# Patient Record
(demographics unavailable — no encounter records)

---

## 2024-12-09 NOTE — PROCEDURE
[FreeTextEntry3] : Procedure Note: Musculoskeletal Injection Diagnosis: Bilateral knee osteoarthritis  Procedure: Bilateral knee, superolateral. Kenalog 40 injection    Indication:  The patient has had persistent pain despite conservative treatment.  Risks, benefits and alternatives to procedure were discussed; all questions were answered to the patient's apparent satisfaction and informed consent obtained.  The patient denied prior problems with local anesthetics, injectable cortisones, chicken allergy, coagulopathy and no relevant drug or preservative allergies or sensitivities.   The area of injection was prepared in a sterile fashion.  Prior to injection a 'Time Out' was conducted in accordance with [employer] policy and the site and nature of procedure verified with the patient.   Procedure: The procedure was carried out utilizing sterile technique from a superolateral arthroscopic portal position. [The needle was placed under ultrasound guidance to improve accuracy and minimize risk to the patient and diagnostic ultrasound in the long and short axis revealed advanced osteoarthritis    Ultrasound Indication: body habitus     0cc of clear synovial fluid was aspirated. The specimen: (X) appeared benign and was discarded ( ) was sent for Culture / Cell Count / Crystal analysis / [_].]     Injection into the target area with care taken to aspirate frequently to minimize the risk of intravascular injection was performed with: ( ) 1cc of Depomedrol (80mg/ml) ( ) 2cc of Betamethasone (Celestone) (6mg/ml) ( ) 1cc of Toradol (30mg/ml) (2X) 9cc of 0.5% Bupivacaine ( ) 1cc of 1% Lidocaine (2X) 2cc Kenalog-50 (40mg/mL)   Patient tolerated the procedure well and direct pressure was applied for hemostasis. The patient was reminded of potential post-injection risks including, but not limited to, delayed hypersensitivity reactions and/or infection.  The patient verified that they had the office and the Emergency Room's contact information if any problems should arise.  After several minutes, the patient informed me that they felt fine and was released from the office.

## 2024-12-09 NOTE — HISTORY OF PRESENT ILLNESS
[de-identified] : Follow up on both knees today. Had Kenalog injections in both previous visit and had obtained some relief until recently. Discomfort now with getting up and down from seated position. Left sometimes more pain than the right.,

## 2024-12-09 NOTE — PHYSICAL EXAM
[Bilateral] : knee bilaterally [NL (140)] : flexion 140 degrees [NL (0)] : extension 0 degrees [Equivocal] : equivocal Juan Alberto [] : no extensor lag

## 2024-12-09 NOTE — PROCEDURE
[FreeTextEntry3] : Procedure Note: Musculoskeletal Injection Diagnosis: Bilateral knee osteoarthritis  Procedure: Bilateral knee, superolateral. Kenalog 40 injection    Indication:  The patient has had persistent pain despite conservative treatment.  Risks, benefits and alternatives to procedure were discussed; all questions were answered to the patient's apparent satisfaction and informed consent obtained.  The patient denied prior problems with local anesthetics, injectable cortisones, chicken allergy, coagulopathy and no relevant drug or preservative allergies or sensitivities.   The area of injection was prepared in a sterile fashion.  Prior to injection a 'Time Out' was conducted in accordance with [facility] [facility] policy and the site and nature of procedure verified with the patient.   Procedure: The procedure was carried out utilizing sterile technique from a superolateral arthroscopic portal position. [The needle was placed under ultrasound guidance to improve accuracy and minimize risk to the patient and diagnostic ultrasound in the long and short axis revealed advanced osteoarthritis    Ultrasound Indication: body habitus     0cc of clear synovial fluid was aspirated. The specimen: (X) appeared benign and was discarded ( ) was sent for Culture / Cell Count / Crystal analysis / [_].]     Injection into the target area with care taken to aspirate frequently to minimize the risk of intravascular injection was performed with: ( ) 1cc of Depomedrol (80mg/ml) ( ) 2cc of Betamethasone (Celestone) (6mg/ml) ( ) 1cc of Toradol (30mg/ml) (2X) 9cc of 0.5% Bupivacaine ( ) 1cc of 1% Lidocaine (2X) 2cc Kenalog-50 (40mg/mL)   Patient tolerated the procedure well and direct pressure was applied for hemostasis. The patient was reminded of potential post-injection risks including, but not limited to, delayed hypersensitivity reactions and/or infection.  The patient verified that they had the office and the Emergency Room's contact information if any problems should arise.  After several minutes, the patient informed me that they felt fine and was released from the office.

## 2024-12-09 NOTE — DISCUSSION/SUMMARY
[Medication Risks Reviewed] : Medication risks reviewed [Surgical risks reviewed] : Surgical risks reviewed [de-identified] :  This is an acute exacerbation of a chronic issue  Confounding medical conditions/concerns: hx of knee arthroscopy, hx of hypertension Tests/Studies Independently Interpreted Today  ------------------------------------------------------------------------------------------------------------------  Discussed risks of surgical treatment and nonsurgical treatment of arthritis, discussed risks of steroid injection plus or minus Visco supplementation, risks of Zilretta and benefits, role of surgery and MRI, risks and role of NSAIDs and side effects, benefits of therapy. The patient is aware and understands that if she fails all conservative treatment modalities, she should consider a total knee arthroplasty.   Discussed options going forward considering she had mild relief from prev Kenalog inj. Discussed repeat kenalog inj vs Zilretta inj. Plan for repeat kenalog inj today  Discussed risks and benefits of total knee arthroplasty vs arthroscopic surgery however, risks outweigh benefits and we will try to avoid.  understands knee replacement high risk procedure  Evaluated the patients BILATERAL KNEE and decided to proceed with Kenalog injections. Discussed future treatment options, will proceed, discussed possible surgery vs alternatives in future if symptoms worsen or persist despite injection.    The risks, benefits and contents of the injection have been discussed. The risks include but are not limited to allergic reaction, flare reaction, permanent white skin discoloration at the injection site and infection. The patient understands the risks and agrees to having the injection. All questions have been answered.   Will submit auth for Zilretta inj for B/L knees  Follow up in 4 weeks if pain persist     I, Sarah Fernandez, attest that this documentation has been prepared under the direction and in the presence of Provider SCOTT Clark

## 2024-12-09 NOTE — DISCUSSION/SUMMARY
[Medication Risks Reviewed] : Medication risks reviewed [Surgical risks reviewed] : Surgical risks reviewed [de-identified] :  This is an acute exacerbation of a chronic issue  Confounding medical conditions/concerns: hx of knee arthroscopy, hx of hypertension Tests/Studies Independently Interpreted Today  ------------------------------------------------------------------------------------------------------------------  Discussed risks of surgical treatment and nonsurgical treatment of arthritis, discussed risks of steroid injection plus or minus Visco supplementation, risks of Zilretta and benefits, role of surgery and MRI, risks and role of NSAIDs and side effects, benefits of therapy. The patient is aware and understands that if she fails all conservative treatment modalities, she should consider a total knee arthroplasty.   Discussed options going forward considering she had mild relief from prev Kenalog inj. Discussed repeat kenalog inj vs Zilretta inj. Plan for repeat kenalog inj today  Discussed risks and benefits of total knee arthroplasty vs arthroscopic surgery however, risks outweigh benefits and we will try to avoid.  understands knee replacement high risk procedure  Evaluated the patients BILATERAL KNEE and decided to proceed with Kenalog injections. Discussed future treatment options, will proceed, discussed possible surgery vs alternatives in future if symptoms worsen or persist despite injection.    The risks, benefits and contents of the injection have been discussed. The risks include but are not limited to allergic reaction, flare reaction, permanent white skin discoloration at the injection site and infection. The patient understands the risks and agrees to having the injection. All questions have been answered.   Will submit auth for Zilretta inj for B/L knees  Follow up in 4 weeks if pain persist     I, Sarah Fernandez, attest that this documentation has been prepared under the direction and in the presence of Provider SCOTT Clark

## 2024-12-09 NOTE — HISTORY OF PRESENT ILLNESS
[de-identified] : Follow up on both knees today. Had Kenalog injections in both previous visit and had obtained some relief until recently. Discomfort now with getting up and down from seated position. Left sometimes more pain than the right.,

## 2025-03-24 NOTE — PROCEDURE
[FreeTextEntry3] : Procedure Note: Musculoskeletal Injection Diagnosis: Bilateral knee osteoarthritis  Procedure: Bilateral knee, superolateral. Kenalog 40 injection    Indication:  The patient has had persistent pain despite conservative treatment.  Risks, benefits and alternatives to procedure were discussed; all questions were answered to the patient's apparent satisfaction and informed consent obtained.  The patient denied prior problems with local anesthetics, injectable cortisones, chicken allergy, coagulopathy and no relevant drug or preservative allergies or sensitivities.   The area of injection was prepared in a sterile fashion.  Prior to injection a 'Time Out' was conducted in accordance with [employer] policy and the site and nature of procedure verified with the patient.   Procedure: The procedure was carried out utilizing sterile technique from a superolateral arthroscopic portal position. [The needle was placed under ultrasound guidance to improve accuracy and minimize risk to the patient and diagnostic ultrasound in the long and short axis revealed advanced osteoarthritis    Ultrasound Indication: body habitus     0cc of clear synovial fluid was aspirated. The specimen: (X) appeared benign and was discarded ( ) was sent for Culture / Cell Count / Crystal analysis / [_].]     Injection into the target area with care taken to aspirate frequently to minimize the risk of intravascular injection was performed with: ( ) 1cc of Depomedrol (80mg/ml) ( ) 2cc of Betamethasone (Celestone) (6mg/ml) ( ) 1cc of Toradol (30mg/ml) (2X) 9cc of 0.5% Bupivacaine ( ) 1cc of 1% Lidocaine (2X) 2cc Kenalog-50 (40mg/mL)   Patient tolerated the procedure well and direct pressure was applied for hemostasis. The patient was reminded of potential post-injection risks including, but not limited to, delayed hypersensitivity reactions and/or infection.  The patient verified that they had the office and the Emergency Room's contact information if any problems should arise.  After several minutes, the patient informed me that they felt fine and was released from the office.

## 2025-03-24 NOTE — PHYSICAL EXAM
[Bilateral] : knee bilaterally [NL (0)] : extension 0 degrees [Equivocal] : equivocal Juan Alberto [] : no extensor lag [[id number]] : flexion 125 degrees

## 2025-03-24 NOTE — DISCUSSION/SUMMARY
[Medication Risks Reviewed] : Medication risks reviewed [Surgical risks reviewed] : Surgical risks reviewed [de-identified] :  This is an acute exacerbation of a chronic issue  Confounding medical conditions/concerns: hx of knee arthroscopy, hx of hypertension Reviewed previous notes and x-rays. ------------------------------------------------------------------------------------------------------------------  Discussed risks of surgical treatment and nonsurgical treatment of arthritis, discussed risks of steroid injection plus or minus Visco supplementation, risks of Zilretta and benefits, role of surgery and MRI, risks and role of NSAIDs and side effects, benefits of therapy. The patient is aware and understands that if she fails all conservative treatment modalities, she should consider a total knee arthroplasty.  discussed knee replacement which is what she needs and best option but high risk with her obesity, discussed weight loss options and drugs like oxempic Discussed options going forward considering she had relief from prev Kenalog inj. Discussed repeat kenalog inj vs Zilretta inj. Plan for repeat Kenalog inj today  Discussed risks and benefits of total knee arthroplasty vs arthroscopic surgery however, risks outweigh benefits and we will try to avoid.  understands knee replacement high risk procedure  Evaluated the patients BILATERAL KNEE and decided to proceed with Kenalog injections. Discussed future treatment options, will proceed, discussed possible surgery vs alternatives in future if symptoms worsen or persist despite injection.    The risks, benefits and contents of the injection have been discussed. The risks include but are not limited to allergic reaction, flare reaction, permanent white skin discoloration at the injection site and infection. The patient understands the risks and agrees to having the injection. All questions have been answered.  she should continued taking prescribed voltaren and discussed side effects Discussed auth for Zilretta inj for B/L knees   Discussed TKA in detail- dispensed literature in office    I, Marguerite Cornell, attest that this documentation has been prepared under the direction and in the presence of Provider SCOTT Clark

## 2025-03-24 NOTE — HISTORY OF PRESENT ILLNESS
[de-identified] : Pt is here for a follow up on her bilateral knees. Had Kenalog/Marcaine shot last visit 12/9/24. Helped until about a month ago. Now in a lot of pain. Some swelling on right knee. Takes diclofenac for the pain with relief.

## 2025-03-24 NOTE — DISCUSSION/SUMMARY
[Medication Risks Reviewed] : Medication risks reviewed [Surgical risks reviewed] : Surgical risks reviewed [de-identified] :  This is an acute exacerbation of a chronic issue  Confounding medical conditions/concerns: hx of knee arthroscopy, hx of hypertension Reviewed previous notes and x-rays. ------------------------------------------------------------------------------------------------------------------  Discussed risks of surgical treatment and nonsurgical treatment of arthritis, discussed risks of steroid injection plus or minus Visco supplementation, risks of Zilretta and benefits, role of surgery and MRI, risks and role of NSAIDs and side effects, benefits of therapy. The patient is aware and understands that if she fails all conservative treatment modalities, she should consider a total knee arthroplasty.  discussed knee replacement which is what she needs and best option but high risk with her obesity, discussed weight loss options and drugs like oxempic Discussed options going forward considering she had relief from prev Kenalog inj. Discussed repeat kenalog inj vs Zilretta inj. Plan for repeat Kenalog inj today  Discussed risks and benefits of total knee arthroplasty vs arthroscopic surgery however, risks outweigh benefits and we will try to avoid.  understands knee replacement high risk procedure  Evaluated the patients BILATERAL KNEE and decided to proceed with Kenalog injections. Discussed future treatment options, will proceed, discussed possible surgery vs alternatives in future if symptoms worsen or persist despite injection.    The risks, benefits and contents of the injection have been discussed. The risks include but are not limited to allergic reaction, flare reaction, permanent white skin discoloration at the injection site and infection. The patient understands the risks and agrees to having the injection. All questions have been answered.  she should continued taking prescribed voltaren and discussed side effects Discussed auth for Zilretta inj for B/L knees   Discussed TKA in detail- dispensed literature in office    I, Marguerite Cornell, attest that this documentation has been prepared under the direction and in the presence of Provider SCOTT Clark

## 2025-03-24 NOTE — PROCEDURE
[FreeTextEntry3] : Procedure Note: Musculoskeletal Injection Diagnosis: Bilateral knee osteoarthritis  Procedure: Bilateral knee, superolateral. Kenalog 40 injection    Indication:  The patient has had persistent pain despite conservative treatment.  Risks, benefits and alternatives to procedure were discussed; all questions were answered to the patient's apparent satisfaction and informed consent obtained.  The patient denied prior problems with local anesthetics, injectable cortisones, chicken allergy, coagulopathy and no relevant drug or preservative allergies or sensitivities.   The area of injection was prepared in a sterile fashion.  Prior to injection a 'Time Out' was conducted in accordance with [facility] [employer] [facility] policy and the site and nature of procedure verified with the patient.   Procedure: The procedure was carried out utilizing sterile technique from a superolateral arthroscopic portal position. [The needle was placed under ultrasound guidance to improve accuracy and minimize risk to the patient and diagnostic ultrasound in the long and short axis revealed advanced osteoarthritis    Ultrasound Indication: body habitus     0cc of clear synovial fluid was aspirated. The specimen: (X) appeared benign and was discarded ( ) was sent for Culture / Cell Count / Crystal analysis / [_].]     Injection into the target area with care taken to aspirate frequently to minimize the risk of intravascular injection was performed with: ( ) 1cc of Depomedrol (80mg/ml) ( ) 2cc of Betamethasone (Celestone) (6mg/ml) ( ) 1cc of Toradol (30mg/ml) (2X) 9cc of 0.5% Bupivacaine ( ) 1cc of 1% Lidocaine (2X) 2cc Kenalog-50 (40mg/mL)   Patient tolerated the procedure well and direct pressure was applied for hemostasis. The patient was reminded of potential post-injection risks including, but not limited to, delayed hypersensitivity reactions and/or infection.  The patient verified that they had the office and the Emergency Room's contact information if any problems should arise.  After several minutes, the patient informed me that they felt fine and was released from the office.

## 2025-03-24 NOTE — DISCUSSION/SUMMARY
[Medication Risks Reviewed] : Medication risks reviewed [Surgical risks reviewed] : Surgical risks reviewed [de-identified] :  This is an acute exacerbation of a chronic issue  Confounding medical conditions/concerns: hx of knee arthroscopy, hx of hypertension Reviewed previous notes and x-rays. ------------------------------------------------------------------------------------------------------------------  Discussed risks of surgical treatment and nonsurgical treatment of arthritis, discussed risks of steroid injection plus or minus Visco supplementation, risks of Zilretta and benefits, role of surgery and MRI, risks and role of NSAIDs and side effects, benefits of therapy. The patient is aware and understands that if she fails all conservative treatment modalities, she should consider a total knee arthroplasty.  discussed knee replacement which is what she needs and best option but high risk with her obesity, discussed weight loss options and drugs like oxempic Discussed options going forward considering she had relief from prev Kenalog inj. Discussed repeat kenalog inj vs Zilretta inj. Plan for repeat Kenalog inj today  Discussed risks and benefits of total knee arthroplasty vs arthroscopic surgery however, risks outweigh benefits and we will try to avoid.  understands knee replacement high risk procedure  Evaluated the patients BILATERAL KNEE and decided to proceed with Kenalog injections. Discussed future treatment options, will proceed, discussed possible surgery vs alternatives in future if symptoms worsen or persist despite injection.    The risks, benefits and contents of the injection have been discussed. The risks include but are not limited to allergic reaction, flare reaction, permanent white skin discoloration at the injection site and infection. The patient understands the risks and agrees to having the injection. All questions have been answered.  she should continued taking prescribed voltaren and discussed side effects Discussed auth for Zilretta inj for B/L knees   Discussed TKA in detail- dispensed literature in office    I, Marguerite Cornell, attest that this documentation has been prepared under the direction and in the presence of Provider SCOTT Clark

## 2025-03-24 NOTE — HISTORY OF PRESENT ILLNESS
[de-identified] : Pt is here for a follow up on her bilateral knees. Had Kenalog/Marcaine shot last visit 12/9/24. Helped until about a month ago. Now in a lot of pain. Some swelling on right knee. Takes diclofenac for the pain with relief.

## 2025-03-24 NOTE — HISTORY OF PRESENT ILLNESS
[de-identified] : Pt is here for a follow up on her bilateral knees. Had Kenalog/Marcaine shot last visit 12/9/24. Helped until about a month ago. Now in a lot of pain. Some swelling on right knee. Takes diclofenac for the pain with relief.

## 2025-03-24 NOTE — PROCEDURE
[FreeTextEntry3] : Procedure Note: Musculoskeletal Injection Diagnosis: Bilateral knee osteoarthritis  Procedure: Bilateral knee, superolateral. Kenalog 40 injection    Indication:  The patient has had persistent pain despite conservative treatment.  Risks, benefits and alternatives to procedure were discussed; all questions were answered to the patient's apparent satisfaction and informed consent obtained.  The patient denied prior problems with local anesthetics, injectable cortisones, chicken allergy, coagulopathy and no relevant drug or preservative allergies or sensitivities.   The area of injection was prepared in a sterile fashion.  Prior to injection a 'Time Out' was conducted in accordance with [facility] [employer] [facility] policy and the site and nature of procedure verified with the patient.   Procedure: The procedure was carried out utilizing sterile technique from a superolateral arthroscopic portal position. [The needle was placed under ultrasound guidance to improve accuracy and minimize risk to the patient and diagnostic ultrasound in the long and short axis revealed advanced osteoarthritis    Ultrasound Indication: body habitus     0cc of clear synovial fluid was aspirated. The specimen: (X) appeared benign and was discarded ( ) was sent for Culture / Cell Count / Crystal analysis / [_].]     Injection into the target area with care taken to aspirate frequently to minimize the risk of intravascular injection was performed with: ( ) 1cc of Depomedrol (80mg/ml) ( ) 2cc of Betamethasone (Celestone) (6mg/ml) ( ) 1cc of Toradol (30mg/ml) (2X) 9cc of 0.5% Bupivacaine ( ) 1cc of 1% Lidocaine (2X) 2cc Kenalog-50 (40mg/mL)   Patient tolerated the procedure well and direct pressure was applied for hemostasis. The patient was reminded of potential post-injection risks including, but not limited to, delayed hypersensitivity reactions and/or infection.  The patient verified that they had the office and the Emergency Room's contact information if any problems should arise.  After several minutes, the patient informed me that they felt fine and was released from the office.

## 2025-05-02 NOTE — PHYSICAL EXAM
[de-identified] :  General appearance: well nourished and hydrated, pleasant, alert and oriented x 3, cooperative. Obese HEENT: Normocephalic, EOM intact, Nasal septum midline, Oral cavity clear, External auditory canal clear. Cardiovascular: no apparent abnormalities, no lower leg edema, no varicosities, pedal pulses are palpable. Lymphatics Lymph nodes: none palpated, Lymphedema: not present. Neurologic: sensation is normal, no muscle weakness in upper or lower extremities, patella tendon reflexes intact . Dermatologic no apparent skin lesions, moist, warm, no rash. Spine:cervical spine appears normal and moves freely, thoracic spine appears normal and moves freely, lumbosacral spine appears normal and moves freely. Gait: nonantalgic.   Left knee Inspection: trace effusion. Wounds: 12-degree Valgus. Alignment: normal. Palpation: medial and lateral tenderness on palpation. ROM active (in degrees): 0-90 with crepitus through the arc of motion  Ligamentous laxity: all ligaments appear stable,, negative ant. drawer test, negative post. drawer test, stable to varus stress test, stable to valgus stress test. negative Lachman's test, negative pivot shift test Meniscal Test: negative McMurrays, negative Glen. Patellofemoral Alignment Test: Q angle-, normal. Muscle Test: good quad strength. Leg examination: calf is soft and non-tender.   Right knee Inspection: mild effusion. Wounds: none. Alignment: 5-degree Varus. Palpation: medial tenderness on palpation. ROM active (in degrees): 0-110 with crepitus through the arc of motion  Ligamentous laxity: all ligaments appear stable,, negative ant. drawer test, negative post. drawer test, stable to varus stress test, stable to valgus stress test. negative Lachman's test, negative pivot shift test Meniscal Test: negative McMurrays, negative Glen. Patellofemoral Alignment Test: Q angle-, normal. Muscle Test: good quad strength. Leg examination: calf is soft and non-tender.   Left hip Inspection: No swelling or ecchymosis. Wounds: none. Palpation: non-tender. Stability: no instability. Strength: 5/5 all motor groups. ROM: no pain with FROM. Leg length: equal.   Right hip Inspection: No swelling or ecchymosis. Wounds: none. Palpation: non-tender. Stability: no instability. Strength: 5/5 all motor groups. ROM: no pain with FROM. Leg length: equal.   Left ankle Inspection: no erythema noted, no swelling noted. Palpation: no pain on palpation . ROM: FROM without crepitus. Muscle strength: 5/5. Stability: no instability noted.   Right ankle Inspection: no erythema noted, no swelling noted. ROM: FROM without crepitus. Palpation: no pain on palpation . Muscle strength: 5/5. Stability: no instability noted.   Left foot Inspection: color, texture and turgor are normal. ROM: full range of motion of all joints without pain or crepitus. Palpation: no tenderness. Stability: no instability noted.   Right foot Inspection: color, texture and turgor are normal. ROM: full range of motion of all joints without pain or crepitus. Palpation: no tenderness. Stability: no instability noted.   Left shoulder Inspection: no muscle asymmetry, no atrophy. Palpation: no tenderness noted, ACJ non-tender. ROM: full active ROM, full passive ROM. Strength testing): anterior deltoid, supraspinatus, infraspinatus, subscapularis all 5/5. Stability test: ant. apprehension negative, post. apprehension negative, relocation test negative. Impingement Test: negative NEER.   Right shoulder Inspection: no muscle asymmetry, no atrophy. Palpation: no tenderness noted, ACJ non-tender. ROM: full active ROM, full passive ROM. Strength testing): anterior deltoid, supraspinatus, infraspinatus, subscapularis all 5/5. Stability test: ant. apprehension negative, post. apprehension negative, relocation test negative. Impingement Test: negative NEER. Surgical Wounds: none.   Left elbow Inspection: negative swelling. Wounds: none. Palpation: non-tender. ROM: full ROM. Strength: 5/5 all groups. Stability: no instability. Mass: none.   Right elbow Inspection: negative swelling. Wounds: none. Palpation: non-tender. ROM: full ROM. Strength: 5/5 all groups. Stability: no instability. Mass: none.   Left wrist Inspection: negative swelling. Wound: none. Palpation (bone): no tenderness. ROM: full ROM. Strength: full , good.   Right wrist Inspection: negative swelling. Wound: none. Palpation (bone): no tenderness. ROM: full ROM. Strength: full , good.   Left hand Inspection: no skin changes, normal appearance. Wounds: none. Strength: full , able to make full fist. Sensation: light touch intact all fingers and thumb. Vascular: good capillary refill < 3 seconds, all fingers and thumb. Mass: none.   Right hand Inspection: no skin changes, normal appearance. Wounds: none. Strength: full , able to make full fist. Sensation: light touch intact all fingers and thumb. Vascular: good capillary refill < 3 seconds, all fingers and thumb. Mass: none. [de-identified] : Right knee x-rays, standing AP/Lateral and Merchant films, and 45-degree PA standing view, taken at the office today shows diffuse tricompartmental degenerative arthritis, mild varus deformity, medial joint space narrowing, marginal osteophytes, medial bone on bone sclerosis, patellofemoral joint space narrowing, peripheral osteophytes, Kellgren and Maykel grade 4.   Left knee x-rays, standing AP/Lateral and Merchant films, and 45-degree PA standing view, taken at the office today shows diffuse tricompartmental degenerative arthritis, valgus deformity, medial joint space narrowing, marginal osteophytes, lateral bone on bone sclerosis, patellofemoral joint space narrowing, peripheral osteophytes, Kellgren and Maykel grade 4.

## 2025-05-02 NOTE — ADDENDUM
[FreeTextEntry1] : This note was written by Negro Payne on 05/02/2025 acting as scribe for Dr. Lyle Guillen M.D.  I, Dr. Lyle Guillen, have read and attest that all the information, medical decision making and discharge instructions within are true and accurate.

## 2025-05-02 NOTE — DISCUSSION/SUMMARY
[de-identified] : Discussed at length the nature of the patient's condition. Their BL knee symptoms appear secondary to severe degenerative arthritis with a Valgus deformity on the left knee and a varus deformity on the right knee. She has failed all non-operative treatment plans at this time. Discussed at length the natural history of degenerative arthritis and reviewed non-operative and operative treatment. Prior non-operative treatment for more than 3 months by either myself or prior treating physicians, including NSAIDs, injection therapy, a structured exercise program or physiotherapy and weight management has not resolved the condition. Due to the pain and associated functional disability that impacts all appropriate activities of daily living, I recommend a staged bilateral total knee replacement, the LEFT side would be done first. A thorough discussion regarding the risks, benefits, convalescence and alternatives were reviewed. The risks can include but are not limited to: anesthesia risk, bleeding wit possible transfusion, nerve injury, infection, revision surgery due to implant failure, bone fracture, deep vein thrombosis, cardiac failure, pneumonia, and respiratory distress. The patient's expectations were reviewed and compared to the surgeon's expectations. This allowed for a discussion regarding reasonable expectations for functional improvement, pain relief, and return to normal activities of daily living. Numerous questions were asked and answered to their satisfaction. The patient was involved in the decision-making process - we discussed implant choice and fixation along with all details of TKA. Models were used as an educational tool. Surgery will be scheduled at a convenient time.  Preop medical clearance.  Patient can continue home exercises, Tylenol, weight management and activities as tolerated. All questions were answered, understanding verbalized. Patient is in agreement with plan of treatment. Patient may follow up as needed.

## 2025-05-02 NOTE — HISTORY OF PRESENT ILLNESS
[de-identified] :  GABE LIMA 67 year old female presents for an initial evaluation of BL knee pain with the left being worse than the right. The pain has been ongoing for years. She had a left knee scope 20 years ago. She has been treating her BL knees with cortisone injections every 3 months as well as gel injections. She takes Diclofenac for pain relief. She notes swelling of right knee and left ankle. She has to be cautious when walking. She uses the riling on stairs. She inquiries about surgery. Current BMI is 41. Medical history includes high blood pressure and GERD. No allergies.

## 2025-05-02 NOTE — CONSULT LETTER
[Dear  ___] : Dear  [unfilled], [Consult Letter:] : I had the pleasure of evaluating your patient, [unfilled]. [Please see my note below.] : Please see my note below. [Consult Closing:] : Thank you very much for allowing me to participate in the care of this patient.  If you have any questions, please do not hesitate to contact me. [Sincerely,] : Sincerely, [FreeTextEntry2] : PRAMOD STEEN

## 2025-06-18 NOTE — HISTORY OF PRESENT ILLNESS
[Preoperative Visit] : for a medical evaluation prior to surgery [Scheduled Procedure ___] : a [unfilled] [Date of Surgery ___] : on [unfilled] [Surgeon Name ___] : surgeon: [unfilled] [FreeTextEntry1] : 67 year-old female seen several years ago for evaluation of hypertension and dyspnea on exertion.  Patient has long-standing history of hypertension but denies any history of diabetes, hyperlipidemia or coronary artery disease.  She has a long-standing history of obesity. Unable to gauge exercise capacity due to inability to ambulate 2/2 bilateral knee pain. States that her BP is currently elevated because of stress. Denied any cardiac family history, nondrinker, nonsmoker. Works as supervisor in office.

## 2025-06-18 NOTE — DISCUSSION/SUMMARY
[Hypertension] : hypertension [Stable] : stable [Treadmill Exercise Test] : a treadmill stress test [Patient] : the patient [___ Year(s)] : in [unfilled] year(s) [Procedure Low Risk] : the procedure risk is low [Additional Diagnostics Recommended] : additional diagnostics recommended [As per surgery] : as per surgery [Continue] : Continue medications as currently directed